# Patient Record
Sex: MALE | Race: WHITE | Employment: UNEMPLOYED | URBAN - METROPOLITAN AREA
[De-identification: names, ages, dates, MRNs, and addresses within clinical notes are randomized per-mention and may not be internally consistent; named-entity substitution may affect disease eponyms.]

---

## 2021-09-15 ENCOUNTER — HOSPITAL ENCOUNTER (EMERGENCY)
Age: 26
Discharge: HOME | End: 2021-09-16
Attending: EMERGENCY MEDICINE
Payer: MEDICAID

## 2021-09-15 DIAGNOSIS — F84.0 AUTISM: Primary | ICD-10-CM

## 2021-09-15 LAB
ALBUMIN SERPL-MCNC: 3.6 G/DL (ref 3.5–5)
ALBUMIN/GLOB SERPL: 1.1 {RATIO} (ref 1.1–2.2)
ALP SERPL-CCNC: 43 U/L (ref 45–117)
ALT SERPL-CCNC: 74 U/L (ref 12–78)
ANION GAP SERPL CALC-SCNC: 6 MMOL/L (ref 5–15)
APAP SERPL-MCNC: 7 UG/ML (ref 10–30)
AST SERPL-CCNC: 120 U/L (ref 15–37)
ATRIAL RATE: 96 BPM
BASOPHILS # BLD: 0 K/UL (ref 0–0.1)
BASOPHILS NFR BLD: 0 % (ref 0–1)
BILIRUB SERPL-MCNC: 0.5 MG/DL (ref 0.2–1)
BUN SERPL-MCNC: 12 MG/DL (ref 6–20)
BUN/CREAT SERPL: 13 (ref 12–20)
CALCIUM SERPL-MCNC: 8.4 MG/DL (ref 8.5–10.1)
CALCULATED P AXIS, ECG09: 19 DEGREES
CALCULATED R AXIS, ECG10: 13 DEGREES
CALCULATED T AXIS, ECG11: 11 DEGREES
CHLORIDE SERPL-SCNC: 101 MMOL/L (ref 97–108)
CO2 SERPL-SCNC: 28 MMOL/L (ref 21–32)
COMMENT, HOLDF: NORMAL
CREAT SERPL-MCNC: 0.91 MG/DL (ref 0.7–1.3)
DIAGNOSIS, 93000: NORMAL
DIFFERENTIAL METHOD BLD: ABNORMAL
EOSINOPHIL # BLD: 0 K/UL (ref 0–0.4)
EOSINOPHIL NFR BLD: 0 % (ref 0–7)
ERYTHROCYTE [DISTWIDTH] IN BLOOD BY AUTOMATED COUNT: 13.1 % (ref 11.5–14.5)
GLOBULIN SER CALC-MCNC: 3.4 G/DL (ref 2–4)
GLUCOSE SERPL-MCNC: 99 MG/DL (ref 65–100)
HCT VFR BLD AUTO: 41.9 % (ref 36.6–50.3)
HGB BLD-MCNC: 14.6 G/DL (ref 12.1–17)
IMM GRANULOCYTES # BLD AUTO: 0 K/UL (ref 0–0.04)
IMM GRANULOCYTES NFR BLD AUTO: 0 % (ref 0–0.5)
LYMPHOCYTES # BLD: 0.7 K/UL (ref 0.8–3.5)
LYMPHOCYTES NFR BLD: 23 % (ref 12–49)
MCH RBC QN AUTO: 28.4 PG (ref 26–34)
MCHC RBC AUTO-ENTMCNC: 34.8 G/DL (ref 30–36.5)
MCV RBC AUTO: 81.5 FL (ref 80–99)
MONOCYTES # BLD: 0.2 K/UL (ref 0–1)
MONOCYTES NFR BLD: 6 % (ref 5–13)
NEUTS SEG # BLD: 2.3 K/UL (ref 1.8–8)
NEUTS SEG NFR BLD: 71 % (ref 32–75)
NRBC # BLD: 0 K/UL (ref 0–0.01)
NRBC BLD-RTO: 0 PER 100 WBC
P-R INTERVAL, ECG05: 120 MS
PLATELET # BLD AUTO: 113 K/UL (ref 150–400)
PMV BLD AUTO: 10.3 FL (ref 8.9–12.9)
POTASSIUM SERPL-SCNC: 3.2 MMOL/L (ref 3.5–5.1)
PROT SERPL-MCNC: 7 G/DL (ref 6.4–8.2)
Q-T INTERVAL, ECG07: 316 MS
QRS DURATION, ECG06: 92 MS
QTC CALCULATION (BEZET), ECG08: 399 MS
RBC # BLD AUTO: 5.14 M/UL (ref 4.1–5.7)
RBC MORPH BLD: ABNORMAL
SALICYLATES SERPL-MCNC: <1.7 MG/DL (ref 2.8–20)
SAMPLES BEING HELD,HOLD: NORMAL
SODIUM SERPL-SCNC: 135 MMOL/L (ref 136–145)
VENTRICULAR RATE, ECG03: 96 BPM
WBC # BLD AUTO: 3.2 K/UL (ref 4.1–11.1)

## 2021-09-15 PROCEDURE — 90791 PSYCH DIAGNOSTIC EVALUATION: CPT

## 2021-09-15 PROCEDURE — 93005 ELECTROCARDIOGRAM TRACING: CPT

## 2021-09-15 PROCEDURE — 36415 COLL VENOUS BLD VENIPUNCTURE: CPT

## 2021-09-15 PROCEDURE — 85025 COMPLETE CBC W/AUTO DIFF WBC: CPT

## 2021-09-15 PROCEDURE — 80053 COMPREHEN METABOLIC PANEL: CPT

## 2021-09-15 PROCEDURE — 80179 DRUG ASSAY SALICYLATE: CPT

## 2021-09-15 PROCEDURE — 99284 EMERGENCY DEPT VISIT MOD MDM: CPT

## 2021-09-15 PROCEDURE — 80143 DRUG ASSAY ACETAMINOPHEN: CPT

## 2021-09-16 VITALS
OXYGEN SATURATION: 95 % | BODY MASS INDEX: 32.68 KG/M2 | SYSTOLIC BLOOD PRESSURE: 120 MMHG | WEIGHT: 215.61 LBS | RESPIRATION RATE: 18 BRPM | HEIGHT: 68 IN | DIASTOLIC BLOOD PRESSURE: 71 MMHG | TEMPERATURE: 97.4 F | HEART RATE: 89 BPM

## 2021-09-16 LAB — APAP SERPL-MCNC: 5 UG/ML (ref 10–30)

## 2021-09-16 PROCEDURE — 74011250637 HC RX REV CODE- 250/637: Performed by: EMERGENCY MEDICINE

## 2021-09-16 PROCEDURE — 36415 COLL VENOUS BLD VENIPUNCTURE: CPT

## 2021-09-16 PROCEDURE — 80143 DRUG ASSAY ACETAMINOPHEN: CPT

## 2021-09-16 RX ORDER — IBUPROFEN 600 MG/1
600 TABLET ORAL
Status: COMPLETED | OUTPATIENT
Start: 2021-09-16 | End: 2021-09-16

## 2021-09-16 RX ADMIN — IBUPROFEN 600 MG: 600 TABLET, FILM COATED ORAL at 03:52

## 2021-09-16 NOTE — ED TRIAGE NOTES
Pt arrives via EMS from bus station to [place] air lifted to [facility]" per EMS. Pt called 911 again to state he wants to be taken to the hospital for a COVID test. Pt reports drinking \"a bunch of Nyquil\". Hx ADHD and ASD. Home Address: [address] [place] [address]     PT arrives with 2 knives. Knives secured and  given to security.

## 2021-09-16 NOTE — DISCHARGE INSTRUCTIONS
Work up in the emergency department was reassuring. Your tylenol levels  are not elevated. Follow up/return to the ED if you feel you are in danger of harming yourself/feeling suicidal or otherwise feel you are unsafe.

## 2021-09-16 NOTE — ED NOTES
Pt called mother. Pt has been off his medications for a year a half and has been homeless. Pt has a bus ticket to [place] but no where to go after he arrives. Pt now requesting psychiatric admission.
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
## 2021-09-16 NOTE — ED PROVIDER NOTES
HPI     71-year-old male with a history of autism and ADHD presents the emergency department with concerns for Covid infection and possible overdose on Tylenol. Patient denies any suicidal homicidal ideation. On arrival to the ED he told the triage nurse he is here to get medevac back to [place] where he is from. Patient states he has been taking Tylenol for the fact past 5 days for his headache cough and fever and is not sure if he was taking too much. He denies any abdominal pain nausea vomiting or diarrhea. He is unsure if he has been vaccinated. Patient is not a good historian. He states he is not been taking his medicine for autism for some time now. Past Medical History:   Diagnosis Date    ADHD     Autism spectrum disorder        No past surgical history on file. No family history on file. Social History     Socioeconomic History    Marital status: SINGLE     Spouse name: Not on file    Number of children: Not on file    Years of education: Not on file    Highest education level: Not on file   Occupational History    Not on file   Tobacco Use    Smoking status: Never Smoker   Vaping Use    Vaping Use: Never used   Substance and Sexual Activity    Alcohol use: Not Currently    Drug use: Never    Sexual activity: Not on file   Other Topics Concern    Not on file   Social History Narrative    Not on file     Social Determinants of Health     Financial Resource Strain:     Difficulty of Paying Living Expenses:    Food Insecurity:     Worried About Running Out of Food in the Last Year:     [address] in the Last Year:    Transportation Needs:     Lack of Transportation (Medical):      Lack of Transportation (Non-Medical):    Physical Activity:     Days of Exercise per Week:     Minutes of Exercise per Session:    Stress:     Feeling of Stress :    Social Connections:     Frequency of Communication with Friends and Family:     Frequency of Social Gatherings with Friends and Family:     Attends Islam Services:     Active Member of Clubs or Organizations:     Attends Club or Organization Meetings:     Marital Status:    Intimate Partner Violence:     Fear of Current or Ex-Partner:     Emotionally Abused:     Physically Abused:     Sexually Abused: ALLERGIES: Patient has no known allergies. Review of Systems   Unable to perform ROS: Psychiatric disorder       Vitals:    09/15/21 2104   BP: 127/70   Pulse: (!) 106   Resp: 16   Temp: 97.4 °F (36.3 °C)   SpO2: 96%   Weight: 97.8 kg (215 lb 9.8 oz)   Height: 5' 8\" (1.727 m)            Physical Exam  Constitutional:       General: He is not in acute distress. Appearance: He is well-developed. HENT:      Head: Normocephalic and atraumatic. Mouth/Throat:      Pharynx: No oropharyngeal exudate. Eyes:      General: No scleral icterus. Right eye: No discharge. Left eye: No discharge. Pupils: Pupils are equal, round, and reactive to light. Neck:      Vascular: No JVD. Cardiovascular:      Rate and Rhythm: Normal rate and regular rhythm. Heart sounds: Normal heart sounds. No murmur heard. Pulmonary:      Effort: Pulmonary effort is normal. No respiratory distress. Breath sounds: Normal breath sounds. No stridor. No wheezing or rales. Chest:      Chest wall: No tenderness. Abdominal:      General: Bowel sounds are normal. There is no distension. Palpations: Abdomen is soft. There is no mass. Tenderness: There is no abdominal tenderness. There is no guarding or rebound. Musculoskeletal:         General: Normal range of motion. Cervical back: Normal range of motion and neck supple. Skin:     General: Skin is warm and dry. Capillary Refill: Capillary refill takes less than 2 seconds. Findings: No rash. Neurological:      Mental Status: He is oriented to person, place, and time.    Psychiatric:         Behavior: Behavior normal. Thought Content: Thought content normal.         Judgment: Judgment normal.          MDM       Procedures    Patient declined a repeat Tylenol level. He is being evaluated by mental health who recommended admission given his poor insight, his ability to care for himself, after speaking with the patient's mother at length. Patient however is declining admission. NAVEEN spoke with on call psych who feels patient can be discharged from their standpoint. Patient continues to claim he took too much tylenol on accident, stating he took up to 10 1000mg tylenol tablets. He is now agreeable to have his blood rechecked. As long as his level is drastically rising, anticipate discharge to bus stop. Patient states he has a ticket to [place]. Repeat Tylenol level is 5 (down from 7) . Vital signs are stable. He will not be admitted to psychiatry and there is no medical indication for admission at this time. Plan is to get him back to the bus station to continue on his trip.     patient is now saying he wants to be admitted to psych to get his medications stablized. He stated he had meds to  at [facility], however when I called [facility] they have no record on file for him. NAVEEN will come eval again.

## 2021-09-17 ENCOUNTER — PATIENT OUTREACH (OUTPATIENT)
Dept: CASE MANAGEMENT | Age: 26
End: 2021-09-17

## 2024-04-12 ENCOUNTER — HOSPITAL ENCOUNTER
Age: 29
Discharge: HOME | End: 2024-04-12
Payer: COMMERCIAL

## 2024-04-12 VITALS
DIASTOLIC BLOOD PRESSURE: 100 MMHG | HEART RATE: 79 BPM | OXYGEN SATURATION: 95 % | SYSTOLIC BLOOD PRESSURE: 130 MMHG | TEMPERATURE: 97.16 F

## 2024-04-12 VITALS — BODY MASS INDEX: 41.5 KG/M2

## 2024-04-12 DIAGNOSIS — M25.562: Primary | ICD-10-CM

## 2024-04-12 PROCEDURE — 99213 OFFICE O/P EST LOW 20 MIN: CPT

## 2024-06-20 ENCOUNTER — HOSPITAL ENCOUNTER
Age: 29
Discharge: HOME | End: 2024-06-20
Payer: COMMERCIAL

## 2024-06-20 VITALS — SYSTOLIC BLOOD PRESSURE: 108 MMHG | DIASTOLIC BLOOD PRESSURE: 74 MMHG | OXYGEN SATURATION: 97 % | HEART RATE: 97 BPM

## 2024-06-20 VITALS — BODY MASS INDEX: 42.6 KG/M2

## 2024-06-20 DIAGNOSIS — R00.2: ICD-10-CM

## 2024-06-20 DIAGNOSIS — M25.562: Primary | ICD-10-CM

## 2024-06-20 PROCEDURE — 99213 OFFICE O/P EST LOW 20 MIN: CPT

## 2024-07-11 ENCOUNTER — HOSPITAL ENCOUNTER
Age: 29
End: 2024-07-11
Payer: COMMERCIAL

## 2024-07-11 ENCOUNTER — HOSPITAL ENCOUNTER (OUTPATIENT)
Dept: HOSPITAL 103 - HO.CARD | Age: 29
End: 2024-07-11
Payer: COMMERCIAL

## 2024-07-11 DIAGNOSIS — R00.2: Primary | ICD-10-CM

## 2024-07-11 DIAGNOSIS — I47.10: Primary | ICD-10-CM

## 2024-07-11 PROCEDURE — 93244 EXT ECG>48HR<7D REV&INTERPJ: CPT

## 2024-07-11 PROCEDURE — 93242 EXT ECG>48HR<7D RECORDING: CPT

## 2024-08-21 ENCOUNTER — HOSPITAL ENCOUNTER
Age: 29
Discharge: HOME | End: 2024-08-21
Payer: COMMERCIAL

## 2024-08-21 VITALS — HEART RATE: 85 BPM | OXYGEN SATURATION: 95 % | SYSTOLIC BLOOD PRESSURE: 130 MMHG | DIASTOLIC BLOOD PRESSURE: 82 MMHG

## 2024-08-21 VITALS — BODY MASS INDEX: 42.9 KG/M2

## 2024-08-21 DIAGNOSIS — S01.511A: ICD-10-CM

## 2024-08-21 DIAGNOSIS — S06.0XAA: Primary | ICD-10-CM
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
## 2024-08-21 PROCEDURE — 99214 OFFICE O/P EST MOD 30 MIN: CPT

## 2024-09-11 ENCOUNTER — HOSPITAL ENCOUNTER (OUTPATIENT)
Dept: HOSPITAL 103 - HO.PTCHIC | Age: 29
LOS: 30 days | Discharge: HOME | End: 2024-10-11
Payer: COMMERCIAL

## 2024-09-11 DIAGNOSIS — M25.562: Primary | ICD-10-CM

## 2024-09-11 PROCEDURE — 97110 THERAPEUTIC EXERCISES: CPT

## 2024-09-11 PROCEDURE — 97161 PT EVAL LOW COMPLEX 20 MIN: CPT

## 2024-09-12 ENCOUNTER — HOSPITAL ENCOUNTER
Age: 29
Discharge: HOME | End: 2024-09-12
Payer: COMMERCIAL

## 2024-09-12 VITALS — BODY MASS INDEX: 42.8 KG/M2

## 2024-09-12 VITALS — DIASTOLIC BLOOD PRESSURE: 80 MMHG | SYSTOLIC BLOOD PRESSURE: 130 MMHG | OXYGEN SATURATION: 97 % | HEART RATE: 72 BPM

## 2024-09-12 DIAGNOSIS — E86.0: ICD-10-CM

## 2024-09-12 DIAGNOSIS — G47.30: ICD-10-CM

## 2024-09-12 DIAGNOSIS — M79.10: ICD-10-CM

## 2024-09-12 DIAGNOSIS — H61.23: ICD-10-CM

## 2024-09-12 DIAGNOSIS — Z00.00: Primary | ICD-10-CM

## 2024-09-12 PROCEDURE — 99395 PREV VISIT EST AGE 18-39: CPT

## 2024-09-12 PROCEDURE — 69209 REMOVE IMPACTED EAR WAX UNI: CPT

## 2024-10-23 ENCOUNTER — HOSPITAL ENCOUNTER (OUTPATIENT)
Dept: HOSPITAL 103 - HO.HMGCX | Age: 29
Discharge: HOME | End: 2024-10-23
Payer: COMMERCIAL

## 2024-10-23 ENCOUNTER — HOSPITAL ENCOUNTER
Age: 29
Discharge: HOME | End: 2024-10-23
Payer: COMMERCIAL

## 2024-10-23 ENCOUNTER — HOSPITAL ENCOUNTER (OUTPATIENT)
Dept: HOSPITAL 103 - HO.LAB | Age: 29
Discharge: HOME | End: 2024-10-23
Payer: COMMERCIAL

## 2024-10-23 VITALS — BODY MASS INDEX: 42.2 KG/M2

## 2024-10-23 VITALS
SYSTOLIC BLOOD PRESSURE: 110 MMHG | OXYGEN SATURATION: 93 % | TEMPERATURE: 97.7 F | HEART RATE: 93 BPM | DIASTOLIC BLOOD PRESSURE: 70 MMHG

## 2024-10-23 DIAGNOSIS — R05.9: Primary | ICD-10-CM

## 2024-10-23 DIAGNOSIS — R07.89: ICD-10-CM

## 2024-10-23 DIAGNOSIS — J06.9: Primary | ICD-10-CM

## 2024-10-23 DIAGNOSIS — R51.9: ICD-10-CM

## 2024-10-23 DIAGNOSIS — R05.9: ICD-10-CM

## 2024-10-23 DIAGNOSIS — J22: Primary | ICD-10-CM

## 2024-10-23 PROCEDURE — 71046 X-RAY EXAM CHEST 2 VIEWS: CPT

## 2024-10-23 PROCEDURE — 87633 RESP VIRUS 12-25 TARGETS: CPT

## 2024-10-23 PROCEDURE — 99212 OFFICE O/P EST SF 10 MIN: CPT

## 2024-11-25 ENCOUNTER — HOSPITAL ENCOUNTER
Age: 29
Discharge: HOME | End: 2024-11-25
Payer: MEDICARE

## 2024-11-25 ENCOUNTER — HOSPITAL ENCOUNTER
Age: 29
End: 2024-11-25
Payer: MEDICARE

## 2024-11-25 VITALS — HEART RATE: 111 BPM | DIASTOLIC BLOOD PRESSURE: 80 MMHG | OXYGEN SATURATION: 94 % | SYSTOLIC BLOOD PRESSURE: 122 MMHG

## 2024-11-25 DIAGNOSIS — S86.912D: Primary | ICD-10-CM

## 2024-11-25 DIAGNOSIS — S86.912S: Primary | ICD-10-CM

## 2024-11-25 PROCEDURE — 99212 OFFICE O/P EST SF 10 MIN: CPT

## 2025-03-13 ENCOUNTER — HOSPITAL ENCOUNTER
Age: 30
Discharge: HOME | End: 2025-03-13
Payer: MEDICARE

## 2025-03-13 ENCOUNTER — HOSPITAL ENCOUNTER (OUTPATIENT)
Dept: HOSPITAL 103 - HO.HOSX | Age: 30
Discharge: HOME | End: 2025-03-13
Payer: MEDICARE

## 2025-03-13 VITALS — BODY MASS INDEX: 42.2 KG/M2

## 2025-03-13 DIAGNOSIS — S93.401A: ICD-10-CM

## 2025-03-13 DIAGNOSIS — S93.401A: Primary | ICD-10-CM

## 2025-03-13 DIAGNOSIS — M23.92: ICD-10-CM

## 2025-03-13 DIAGNOSIS — M25.562: ICD-10-CM

## 2025-03-13 DIAGNOSIS — S83.91XA: ICD-10-CM

## 2025-03-13 DIAGNOSIS — M25.579: ICD-10-CM

## 2025-03-13 DIAGNOSIS — M17.11: Primary | ICD-10-CM

## 2025-03-13 PROCEDURE — 73610 X-RAY EXAM OF ANKLE: CPT

## 2025-03-13 PROCEDURE — 73562 X-RAY EXAM OF KNEE 3: CPT

## 2025-03-13 PROCEDURE — 99204 OFFICE O/P NEW MOD 45 MIN: CPT

## 2025-03-13 PROCEDURE — 99202 OFFICE O/P NEW SF 15 MIN: CPT

## 2025-03-22 ENCOUNTER — HOSPITAL ENCOUNTER (OUTPATIENT)
Dept: HOSPITAL 103 - HO.MRI | Age: 30
Discharge: HOME | End: 2025-03-22
Payer: MEDICARE

## 2025-03-22 DIAGNOSIS — M23.92: Primary | ICD-10-CM

## 2025-03-22 PROCEDURE — 73721 MRI JNT OF LWR EXTRE W/O DYE: CPT

## 2025-03-27 ENCOUNTER — HOSPITAL ENCOUNTER (OUTPATIENT)
Dept: HOSPITAL 103 - HO.PTCHIC | Age: 30
LOS: 117 days | Discharge: HOME | End: 2025-07-22
Payer: MEDICARE

## 2025-03-27 DIAGNOSIS — S83.91XD: ICD-10-CM

## 2025-03-27 DIAGNOSIS — S93.401D: Primary | ICD-10-CM

## 2025-03-27 PROCEDURE — 97162 PT EVAL MOD COMPLEX 30 MIN: CPT

## 2025-03-27 PROCEDURE — 97110 THERAPEUTIC EXERCISES: CPT

## 2025-04-17 ENCOUNTER — HOSPITAL ENCOUNTER
Age: 30
End: 2025-04-17
Payer: MEDICARE

## 2025-04-17 ENCOUNTER — HOSPITAL ENCOUNTER
Dept: HOSPITAL 103 - HO.HOS | Age: 30
Discharge: HOME | End: 2025-04-17
Payer: MEDICARE

## 2025-04-17 VITALS — BODY MASS INDEX: 42.2 KG/M2

## 2025-04-17 DIAGNOSIS — R20.2: ICD-10-CM

## 2025-04-17 DIAGNOSIS — R20.0: ICD-10-CM

## 2025-04-17 DIAGNOSIS — Z71.2: ICD-10-CM

## 2025-04-17 DIAGNOSIS — M25.562: ICD-10-CM

## 2025-04-17 DIAGNOSIS — M23.92: Primary | ICD-10-CM

## 2025-04-17 PROCEDURE — 20610 DRAIN/INJ JOINT/BURSA W/O US: CPT

## 2025-04-17 PROCEDURE — 99214 OFFICE O/P EST MOD 30 MIN: CPT

## 2025-04-17 PROCEDURE — 99212 OFFICE O/P EST SF 10 MIN: CPT

## 2025-05-13 ENCOUNTER — HOSPITAL ENCOUNTER
Dept: HOSPITAL 103 - HO.HOS | Age: 30
Discharge: HOME | End: 2025-05-13
Payer: MEDICARE

## 2025-05-13 DIAGNOSIS — S92.251A: Primary | ICD-10-CM

## 2025-05-13 PROCEDURE — 99213 OFFICE O/P EST LOW 20 MIN: CPT

## 2025-05-19 ENCOUNTER — HOSPITAL ENCOUNTER
Dept: HOSPITAL 103 - HO.HOS | Age: 30
Discharge: HOME | End: 2025-05-19
Payer: MEDICARE

## 2025-05-19 ENCOUNTER — HOSPITAL ENCOUNTER
Age: 30
End: 2025-05-19
Payer: MEDICARE

## 2025-05-19 DIAGNOSIS — M25.562: Primary | ICD-10-CM

## 2025-05-19 PROCEDURE — 99212 OFFICE O/P EST SF 10 MIN: CPT

## 2025-05-19 PROCEDURE — 99213 OFFICE O/P EST LOW 20 MIN: CPT

## 2025-06-27 ENCOUNTER — HOSPITAL ENCOUNTER
Dept: HOSPITAL 103 - HO.HOS | Age: 30
Discharge: HOME | End: 2025-06-27
Payer: MEDICARE

## 2025-06-27 VITALS — BODY MASS INDEX: 42.2 KG/M2

## 2025-06-27 DIAGNOSIS — S93.401A: Primary | ICD-10-CM

## 2025-06-27 PROCEDURE — 99213 OFFICE O/P EST LOW 20 MIN: CPT
